# Patient Record
Sex: MALE | Race: WHITE | NOT HISPANIC OR LATINO | Employment: FULL TIME | URBAN - METROPOLITAN AREA
[De-identification: names, ages, dates, MRNs, and addresses within clinical notes are randomized per-mention and may not be internally consistent; named-entity substitution may affect disease eponyms.]

---

## 2017-10-09 ENCOUNTER — TELEPHONE (OUTPATIENT)
Dept: UROLOGY | Facility: CLINIC | Age: 52
End: 2017-10-09

## 2017-10-09 DIAGNOSIS — R30.0 DYSURIA: Primary | ICD-10-CM

## 2017-10-09 NOTE — TELEPHONE ENCOUNTER
"[facility] Call Center    Phone Message    Name of Caller: Suraj    Phone Number: Home number on file [phone] (home) or Cell number on file:    Telephone Information:   Mobile [phone]         Best time to return call: Any    May a detailed message be left on voicemail: yes    Relation to patient: Self    Reason for Call: Symptoms or Concerns     Does patient have any of the following \"red flag\" symptoms: None    Does patient have any \"Red Flag\" symptoms? No.     Current symptom or concern: Patient was last seen with Dr. Aleman on 11/09/16 and since then has had surgery on colon at [facility] and states they were going to look at his bladder at that time. Patient currently experiencing  intermittent burning sensation in bladder \"feels like its on fire\" denies any signs of blood in urine. Patient would like to follow up and called to discuss what is recommended for him- does he need to be scheduled for a cystoscopy or a follow up with Dr. Aleman.     Symptoms have been present for:  1 month(s)      Action Taken: Message routed to:  Adult Clinics: Urology p [id number]    "

## 2017-10-09 NOTE — TELEPHONE ENCOUNTER
Left generic message with request for patient to return call back to clinic. When patient returns call, will discuss message below and encourage patient to leave a urine sample for UA/UC to rule out infection.     Charity Hahn RN, BSN

## 2017-10-10 NOTE — TELEPHONE ENCOUNTER
Patient is calling returning phone call to clinic. Patient states he is getting frustrated that he has not been able to reach the clinic. Please advise.

## 2017-10-10 NOTE — TELEPHONE ENCOUNTER
Nurse spoke to patient, he is having burning in his bladder, advised that he submit a UA/UC.  He will submit tomorrow morning.    Molly Cabral RN, BSN  [facility]  GI/Gen Surg/Hepatology Care Coordinator

## 2017-10-10 NOTE — TELEPHONE ENCOUNTER
[facility] Call Center    Phone Message    Name of Caller: WILY GOMES    Phone Number: Home number on file [phone] (home)    Best time to return call: TODAY    May a detailed message be left on voicemail: no    Relation to patient: Self    Reason for Call: Other: Please call patient back.  He will be at work and will try to answer his phone.       Action Taken: Message routed to:  Adult Clinics: Urology p [phone]

## 2017-10-11 DIAGNOSIS — R30.0 DYSURIA: ICD-10-CM

## 2017-10-11 LAB
ALBUMIN UR-MCNC: NEGATIVE MG/DL
APPEARANCE UR: CLEAR
BILIRUB UR QL STRIP: NEGATIVE
COLOR UR AUTO: YELLOW
GLUCOSE UR STRIP-MCNC: NEGATIVE MG/DL
HGB UR QL STRIP: NEGATIVE
KETONES UR STRIP-MCNC: NEGATIVE MG/DL
LEUKOCYTE ESTERASE UR QL STRIP: NEGATIVE
NITRATE UR QL: NEGATIVE
PH UR STRIP: 6 PH (ref 5–7)
SOURCE: NORMAL
SP GR UR STRIP: 1.02 (ref 1–1.03)
UROBILINOGEN UR STRIP-ACNC: 0.2 EU/DL (ref 0.2–1)

## 2017-10-11 PROCEDURE — 81003 URINALYSIS AUTO W/O SCOPE: CPT | Performed by: UROLOGY

## 2017-10-11 NOTE — TELEPHONE ENCOUNTER
"Patient called after reading result note from Dr. Aleman. Patient aware of normal 10/11/17 UA results. Informed patient that per Dr. Aleman, with patient's history of bladder cancer, patient should see Dr. Perkins or Dr. Demarco at the [facility]. Patient expressed frustration and stated, \"I already saw Dr. Perkins and it didn't go well. I would rather just see someone in [place]. I don't really know what happened or if I even had cancer.\" Patient questions answered. Patient was given number to [facility] and will call to schedule.     Patient scheduled to see Dr. Demarco on 10/14/17 at the [facility]. Will close encounter at this time.    Charity Hahn RN, BSN    "

## 2017-10-12 ENCOUNTER — PRE VISIT (OUTPATIENT)
Dept: UROLOGY | Facility: CLINIC | Age: 52
End: 2017-10-12

## 2017-11-22 ENCOUNTER — OFFICE VISIT (OUTPATIENT)
Dept: UROLOGY | Facility: CLINIC | Age: 52
End: 2017-11-22
Payer: COMMERCIAL

## 2017-11-22 VITALS
SYSTOLIC BLOOD PRESSURE: 159 MMHG | TEMPERATURE: 97.9 F | DIASTOLIC BLOOD PRESSURE: 99 MMHG | HEART RATE: 100 BPM | OXYGEN SATURATION: 98 %

## 2017-11-22 DIAGNOSIS — Z85.51 PERSONAL HISTORY OF MALIGNANT NEOPLASM OF BLADDER: Primary | ICD-10-CM

## 2017-11-22 LAB
ALBUMIN UR-MCNC: 10 MG/DL
APPEARANCE UR: CLEAR
BILIRUB UR QL STRIP: NEGATIVE
COLOR UR AUTO: YELLOW
GLUCOSE UR STRIP-MCNC: NEGATIVE MG/DL
HGB UR QL STRIP: NEGATIVE
KETONES UR STRIP-MCNC: NEGATIVE MG/DL
LEUKOCYTE ESTERASE UR QL STRIP: NEGATIVE
MUCOUS THREADS #/AREA URNS LPF: PRESENT /LPF
NITRATE UR QL: NEGATIVE
PH UR STRIP: 7 PH (ref 5–7)
RBC #/AREA URNS AUTO: ABNORMAL /HPF
SOURCE: ABNORMAL
SP GR UR STRIP: 1.01 (ref 1–1.03)
UROBILINOGEN UR STRIP-MCNC: NORMAL MG/DL (ref 0–2)
WBC #/AREA URNS AUTO: ABNORMAL /HPF

## 2017-11-22 PROCEDURE — 88112 CYTOPATH CELL ENHANCE TECH: CPT | Performed by: UROLOGY

## 2017-11-22 PROCEDURE — 52000 CYSTOURETHROSCOPY: CPT | Performed by: UROLOGY

## 2017-11-22 PROCEDURE — 81001 URINALYSIS AUTO W/SCOPE: CPT | Performed by: UROLOGY

## 2017-11-22 ASSESSMENT — PAIN SCALES - GENERAL: PAINLEVEL: NO PAIN (0)

## 2017-11-22 NOTE — LETTER
11/22/2017      RE: Suraj Green  [address] [facility] [address]       Pre-procedure diagnosis:  History of LG TA TCCB, also CIS of the bladder.  Post-procedure diagnosis: Normal cystoscopy  Procedure performed:  Cystourethroscopy  Surgeon:    Jose AVILEZ   Anesthesia:    Local    Indications for procedure:   Suraj Green is a 50 year old male with a history of low grade Ta TCC resected Jan 2016  Normal upper tracts at that time.  His first surveillance cystoscopy May 2016 was negative..    In Sept 2016 he had a partial colectomy at diverting colostomy ( temporary) at [facility].  He had gross hematuria during that hospital stay.  Also there was some concern that diverticulosis was invading or involving the bladder.  Cystoscopy/biopsy/fulguration done at the outside facility 9/2/16 showed CIS of the posterior bladder wall.  This was just fulgurated,  no BCG was done.    I did a follow-up cystoscopy Nov 9, 2016. Findings at that time were:  At the dome there was a 1.5 cm lump below the mucosa,- appears to be urachal remnant, perhaps larger than last scope 6 months ago.  There was a small resection site that looked to be on the right floor. Slight neovascularity around that scar but no evidence of recurrent tumor.  There were two scars near the posterior wall.  Possible slight cobblestoning of mucosa around the urachal/dome mass.    I referred him to Dr. Demarco for an opinion on the urachal mass but he did not keep that raymond.  He's here for a follow-up cystoscopy today 1 year later, having had no urology follow-up since 11/2016.      Description of procedure:   After fully informed, voluntary consent was obtained, the patient was brought into the procedure room, identified and placed in a supine position on the cysto table.  The groin/scrotum were prepped and draped in a sterile fashion with betadine.  A 15F flexible cystoscope was inserted into the urethra and the bladder and urethra examined  in a systematic manner.  The patient tolerated the procedure well and there were no complications.      Cystoscopic findings:  The urethra showed a fossa stricture that dilated with moderately firm passage of the cystoscope. There was a scant band of tissue at the proximal bulbous urethra.  The prostate was 3cm long and demonstrated bilobar hypertrophy.  There was no median lobe.  The bladder was completely surveyed.  There was no significant  trabeculation.  There were no tumors, stones, or diverticula identifed.  The ureteric orifices were normal in position and number and effluxing clear urine.  The previously seen dome lesion was not present- perhaps this was an intestinal diverticular problem that has healed.  There was a small old TURBT resection site that looked to be on the mid floor. Slight neovascularity around that scar but no evidence of recurrent tumor.  There were two scars near the posterior wall. Old cystoscopy/biopsy/fulguration scars seen on the right and left wall    Assessment/Plan:   Suraj Green is a 52 year old male with a history of Ta and CIS bladder cancer , now with no cancer findings on cystoscopy.      -send urinary cytology today  - return to clinic office cystoscopy 1 year.  - advised smoking cessation.  Jose Aleman MD

## 2017-11-22 NOTE — NURSING NOTE
"Suraj Green's goals for this visit include:   Chief Complaint   Patient presents with     RECHECK     Bladder f/u       He requests these members of his care team be copied on today's visit information: Yes    PCP: Lucía Edmonds        Chief Complaint   Patient presents with     RECHECK     Bladder f/u       Initial BP (!) 159/99 (BP Location: Left arm, Patient Position: Sitting, Cuff Size: Adult Regular)  Pulse 100  Temp 97.9  F (36.6  C) (Oral)  SpO2 98% Estimated body mass index is 33.5 kg/(m^2) as calculated from the following:    Height as of 4/27/16: 1.727 m (5' 8\").    Weight as of 4/27/16: 99.9 kg (220 lb 4.8 oz).  Medication Reconciliation: complete    Do you need any medication refills at today's visit? No    "

## 2017-11-22 NOTE — MR AVS SNAPSHOT
After Visit Summary   11/22/2017    Suraj Green    MRN: [id number]           Patient Information     Date Of Birth          1965        Visit Information        Provider Department      11/22/2017 9:00 AM Heron Aleman MD [facility]        Care Instructions    After Your Cystoscopy    What happens after the exam?    You may go back to your normal diet and activity as you feel ready, unless your doctor tells you not to.    For the next two days, you may notice:    Some blood in your urine  Some burning when you urinate (use the toilet)  An urge to urinate more often  Bladder spasms    These are normal after the procedure and should go away after a day or two.  To relieve these problems drink 6 to 8 large glasses of water each day (includes drinks at meals) as this will help clear the urine.  Take warm baths to relieve pain and bladder spasms.  Do not add anything to the bath water.  You may also take Tylenol (acetaminophen) for pain if needed.    When should I call my doctor?    A fever over 100F (38C) for more than a day. (Before you call the doctor, check your temperature under your tongue)  Chills  Failure to urinate: No urine comes out when you try to use the toilet. (Try soaking in a bathtub full of warm water. If still no urine, call your doctor)  A lot of blood in the urine, or blood clots larger than a nickel  Pain in the back or belly area (abdomen)  Pain or spasms that are not relieved by warm tub baths and pain medicine  Severe pain, burning or other problems while passing urine  Pain that gets worse after two days                      Follow-ups after your visit        Your next 10 appointments already scheduled     Nov 21, 2018  8:30 AM CST   CYSTO with Heron Aleman MD   [facility]    [address] [address] [place] [id number] [phone]              Who to contact     If you have questions or need  follow up information about today's clinic visit or your schedule please contact [facility] directly at [phone].  Normal or non-critical lab and imaging results will be communicated to you by [employer], letter or phone within 4 business days after the clinic has received the results. If you do not hear from us within 7 days, please contact the clinic through [employer] or phone. If you have a critical or abnormal lab result, we will notify you by phone as soon as possible.  Submit refill requests through [employer] or call your pharmacy and they will forward the refill request to us. Please allow 3 business days for your refill to be completed.          Additional Information About Your Visit        [employer] Information     [employer] gives you secure access to your electronic health record. If you see a primary care provider, you can also send messages to your care team and make appointments. If you have questions, please call your primary care clinic.  If you do not have a primary care provider, please call [phone] and they will assist you.      [employer] is an electronic gateway that provides easy, online access to your medical records. With [employer], you can request a clinic appointment, read your test results, renew a prescription or communicate with your care team.     To access your existing account, please contact your [facility] or call [phone] for assistance.        Care EveryWhere ID     This is your Care EveryWhere ID. This could be used by other organizations to access your [facility] medical records  [id number]        Your Vitals Were     Pulse Temperature Pulse Oximetry             100 97.9  F (36.6  C) (Oral) 98%          Blood Pressure from Last 3 Encounters:   11/22/17 (!) 159/99   11/09/16 (!) 168/102   05/04/16 (!) 163/99    Weight from Last 3 Encounters:   04/27/16 99.9 kg (220 lb 4.8 oz)   06/07/11 108.3 kg (238 lb 12.8 oz)   05/17/11 111.5 kg  (245 lb 13 oz)              Today, you had the following     No orders found for display       Primary Care Provider Office Phone # Fax #    [facility] [phone]       [place] [id number] [facility] [address]        Equal Access to Services     DANUTA HAHN : Karyn smith hadasho Soomaali, waaxda luqadaha, qaybta kaalmada adeegyada, waxsharan leefayeligio mckeon. So [facility] [phone].    ATENCIÓN: Si habla español, tiene a victor disposición servicios gratuitos de asistencia lingüística. Llame al [phone].    We comply with applicable federal civil rights laws and [place] laws. We do not discriminate on the basis of race, color, national origin, age, disability, sex, sexual orientation, or gender identity.            Thank you!     Thank you for choosing [facility]  for your care. Our goal is always to provide you with excellent care. Hearing back from our patients is one way we can continue to improve our services. Please take a few minutes to complete the written survey that you may receive in the mail after your visit with us. Thank you!             Your Updated Medication List - Protect others around you: Learn how to safely use, store and throw away your medicines at www.disposemymeds.org.          This list is accurate as of: 11/22/17  9:29 AM.  Always use your most recent med list.                   Brand Name Dispense Instructions for use Diagnosis    aspirin 81 MG EC tablet           fish oil-omega-3 fatty acids 1000 MG capsule           MULTIVITAMINS PO           PROBIOTIC & ACIDOPHILUS EX ST PO

## 2017-11-22 NOTE — PROGRESS NOTES
Pre-procedure diagnosis:  History of LG TA TCCB, also CIS of the bladder.  Post-procedure diagnosis: Normal cystoscopy  Procedure performed:  Cystourethroscopy  Surgeon:    Jose AVILEZ   Anesthesia:    Local    Indications for procedure:   Suraj Green is a 50 year old male with a history of low grade Ta TCC resected Jan 2016  Normal upper tracts at that time.  His first surveillance cystoscopy May 2016 was negative..    In Sept 2016 he had a partial colectomy at diverting colostomy ( temporary) at [facility].  He had gross hematuria during that hospital stay.  Also there was some concern that diverticulosis was invading or involving the bladder.  Cystoscopy/biopsy/fulguration done at the outside facility 9/2/16 showed CIS of the posterior bladder wall.  This was just fulgurated,  no BCG was done.    I did a follow-up cystoscopy Nov 9, 2016. Findings at that time were:  At the dome there was a 1.5 cm lump below the mucosa,- appears to be urachal remnant, perhaps larger than last scope 6 months ago.  There was a small resection site that looked to be on the right floor. Slight neovascularity around that scar but no evidence of recurrent tumor.  There were two scars near the posterior wall.  Possible slight cobblestoning of mucosa around the urachal/dome mass.    I referred him to Dr. Demarco for an opinion on the urachal mass but he did not keep that raymond.  He's here for a follow-up cystoscopy today 1 year later, having had no urology follow-up since 11/2016.      Description of procedure:   After fully informed, voluntary consent was obtained, the patient was brought into the procedure room, identified and placed in a supine position on the cysto table.  The groin/scrotum were prepped and draped in a sterile fashion with betadine.  A 15F flexible cystoscope was inserted into the urethra and the bladder and urethra examined in a systematic manner.  The patient tolerated the procedure well and there were no  complications.      Cystoscopic findings:  The urethra showed a fossa stricture that dilated with moderately firm passage of the cystoscope. There was a scant band of tissue at the proximal bulbous urethra.  The prostate was 3cm long and demonstrated bilobar hypertrophy.  There was no median lobe.  The bladder was completely surveyed.  There was no significant  trabeculation.  There were no tumors, stones, or diverticula identifed.  The ureteric orifices were normal in position and number and effluxing clear urine.  The previously seen dome lesion was not present- perhaps this was an intestinal diverticular problem that has healed.  There was a small old TURBT resection site that looked to be on the mid floor. Slight neovascularity around that scar but no evidence of recurrent tumor.  There were two scars near the posterior wall. Old cystoscopy/biopsy/fulguration scars seen on the right and left wall    Assessment/Plan:   Suraj Green is a 52 year old male with a history of Ta and CIS bladder cancer , now with no cancer findings on cystoscopy.      -send urinary cytology today  - return to clinic office cystoscopy 1 year.  - advised smoking cessation.  Jose AVILEZ

## 2017-11-24 LAB — COPATH REPORT: NORMAL

## 2018-12-03 ENCOUNTER — TELEPHONE (OUTPATIENT)
Dept: UROLOGY | Facility: CLINIC | Age: 53
End: 2018-12-03

## 2018-12-03 NOTE — TELEPHONE ENCOUNTER
[facility] Call Center    Phone Message    May a detailed message be left on voicemail: yes    Reason for Call: Other: Pt and wife made a MyChart message and are wondering if they scheduled correctly for what he needs to get done since he missed an appt in November.  Please check and ok to leave message.  Pt wants to make sure everything that they need to get done is on that day and before then end of the year.       Action Taken: Message routed to:  Adult Clinics: Urology p [phone]

## 2018-12-03 NOTE — TELEPHONE ENCOUNTER
Chart reviewed and per 11/22/17 office note from Dr. Aleman, patient to return to clinic in 1 year for office cystoscopy. Unable to accommodate cystoscopy with Dr. Aleman before the end of the year. Cysto spot with Dr. Lopez on hold for 12/18/18 in [place].    Attempted to reach patient by phone, but no answer. Left message for patient stating that we received the message and requested for patient to return call to clinic. When patient returns call, will inform him of the above information.    Charity Hahn RN, BSN

## 2019-10-03 ENCOUNTER — HEALTH MAINTENANCE LETTER (OUTPATIENT)
Age: 54
End: 2019-10-03

## 2020-11-07 ENCOUNTER — HEALTH MAINTENANCE LETTER (OUTPATIENT)
Age: 55
End: 2020-11-07

## 2021-09-05 ENCOUNTER — HEALTH MAINTENANCE LETTER (OUTPATIENT)
Age: 56
End: 2021-09-05

## 2021-12-10 ENCOUNTER — OFFICE VISIT (OUTPATIENT)
Dept: UROLOGY | Facility: CLINIC | Age: 56
End: 2021-12-10
Payer: COMMERCIAL

## 2021-12-10 VITALS — HEART RATE: 101 BPM | OXYGEN SATURATION: 96 % | SYSTOLIC BLOOD PRESSURE: 147 MMHG | DIASTOLIC BLOOD PRESSURE: 84 MMHG

## 2021-12-10 DIAGNOSIS — R03.0 ELEVATED BP WITHOUT DIAGNOSIS OF HYPERTENSION: ICD-10-CM

## 2021-12-10 DIAGNOSIS — N40.1 BENIGN PROSTATIC HYPERPLASIA WITH LOWER URINARY TRACT SYMPTOMS, SYMPTOM DETAILS UNSPECIFIED: Primary | ICD-10-CM

## 2021-12-10 DIAGNOSIS — Z85.51 PERSONAL HISTORY OF MALIGNANT NEOPLASM OF BLADDER: ICD-10-CM

## 2021-12-10 LAB
ALBUMIN UR-MCNC: NEGATIVE MG/DL
APPEARANCE UR: CLEAR
BACTERIA #/AREA URNS HPF: ABNORMAL /HPF
BILIRUB UR QL STRIP: NEGATIVE
COLOR UR AUTO: YELLOW
GLUCOSE UR STRIP-MCNC: NEGATIVE MG/DL
HGB UR QL STRIP: ABNORMAL
KETONES UR STRIP-MCNC: NEGATIVE MG/DL
LEUKOCYTE ESTERASE UR QL STRIP: NEGATIVE
NITRATE UR QL: NEGATIVE
PH UR STRIP: 7 [PH] (ref 5–7)
RBC #/AREA URNS AUTO: ABNORMAL /HPF
SP GR UR STRIP: 1.02 (ref 1–1.03)
UROBILINOGEN UR STRIP-ACNC: 0.2 E.U./DL
WBC #/AREA URNS AUTO: ABNORMAL /HPF

## 2021-12-10 PROCEDURE — 81001 URINALYSIS AUTO W/SCOPE: CPT | Performed by: UROLOGY

## 2021-12-10 PROCEDURE — 99204 OFFICE O/P NEW MOD 45 MIN: CPT | Mod: 25 | Performed by: UROLOGY

## 2021-12-10 PROCEDURE — 88112 CYTOPATH CELL ENHANCE TECH: CPT | Performed by: PATHOLOGY

## 2021-12-10 PROCEDURE — 51798 US URINE CAPACITY MEASURE: CPT | Performed by: UROLOGY

## 2021-12-10 RX ORDER — TAMSULOSIN HYDROCHLORIDE 0.4 MG/1
0.4 CAPSULE ORAL AT BEDTIME
Qty: 30 CAPSULE | Refills: 1 | Status: SHIPPED | OUTPATIENT
Start: 2021-12-10 | End: 2022-12-30

## 2021-12-10 NOTE — PATIENT INSTRUCTIONS
[employer] is for any [facility] looking for free support to quit smoking, vaping or chewing.   [employer] will offer many quit support options and resources so [place] residents can continue to find the way to quit that works best for them.   Free support includes personalized coaching, email and text support, educational materials, and quit medication (nicotine patches, gum or lozenges) delivered by mail.     Contact [employer] at 1-800-QUIT-NOW or online at [employer] to receive support on your quit journey.

## 2021-12-10 NOTE — PROGRESS NOTES
S: Suraj Green is a pleasant  56 year old male who was seen for a consult with regard to patient's urinary complaints.  Patient complains of Sensation of incomplete emptying, Strain to Urinate, Nocturia x 1, Urgency, Frequency, Intermittency and slow urinary stream.  He has no history of elevated PSA.  Symptoms have been on going for a couple of years(s).  Seems to be worsened over time.  His recent PSA was found to be normal.    His AUA Symptom Score:  20.  His QOL score:  4.  He has no dysuria or hematuria.  He has h/o low grade bladder cancer with last cystoscopy done in 2017.  He continues to smoke.    Current Outpatient Medications   Medication Sig Dispense Refill     aspirin 81 MG EC tablet        fish oil-omega-3 fatty acids 1000 MG capsule        Multiple Vitamin (MULTIVITAMINS PO)        tamsulosin (FLOMAX) 0.4 MG capsule Take 1 capsule (0.4 mg) by mouth At Bedtime 30 capsule 1     Allergies   Allergen Reactions     Ciprofloxacin Hives     Hives developed after IV Cipro     Past Medical History:   Diagnosis Date     Bladder cancer (H)      Diverticulitis      Palpitations      Past Surgical History:   Procedure Laterality Date     BLADDER SURGERY  1/2016    bladder tuor resection.     LAPAROSCOPIC APPENDECTOMY        Family History   Problem Relation Age of Onset     Heart Disease Father      Cancer - colorectal Maternal Grandfather      He does not have a family history of prostate cancer.  Social History     Socioeconomic History     Marital status:      Spouse name: None     Number of children: None     Years of education: None     Highest education level: None   Occupational History     None   Tobacco Use     Smoking status: Current Every Day Smoker     Packs/day: 1.00     Types: Cigarettes     Smokeless tobacco: Never Used   Substance and Sexual Activity     Alcohol use: Yes     Alcohol/week: 12.0 standard drinks     Types: 12 Standard drinks or equivalent per week     Comment: occasional      Drug use: No     Sexual activity: None   Other Topics Concern     Parent/sibling w/ CABG, MI or angioplasty before 65F 55M? Not Asked   Social History Narrative     None     Social Determinants of Health     Financial Resource Strain: Not on file   Food Insecurity: Not on file   Transportation Needs: Not on file   Physical Activity: Not on file   Stress: Not on file   Social Connections: Not on file   Intimate Partner Violence: Not on file   Housing Stability: Not on file        REVIEW OF SYSTEMS  =================  C: NEGATIVE for fever, chills, change in weight  I: NEGATIVE for worrisome rashes, moles or lesions  E/M: NEGATIVE for ear, mouth and throat problems  R: NEGATIVE for significant cough or SHORTNESS OF BREATH  CV:  NEGATIVE for chest pain, palpitations or peripheral edema  GI: NEGATIVE for nausea, abdominal pain, heartburn, or change in bowel habits  NEURO: NEGATIVE numbness/weakness  : see HPI  PSYCH: NEGATIVE depression/anxiety  LYmph: no new enlarged lymph nodes  Ortho: no new trauma/movements           O: Exam:BP (!) 147/84 (BP Location: Right arm, Patient Position: Chair, Cuff Size: Adult Large)   Pulse 101   SpO2 96%    Constitutional: healthy, alert and no distress  Cardiovascular: negative, PMI normal.   Respiratory: negative, no evidence of respiratory distress  Gastrointestinal: Abdomen soft, non-tender. BS normal. No masses, organomegaly  : penis no discharge. Testis no masses.  No scrotal skin lesion.  Prostate 30 gm smooth.  Musculoskeletal: extremities normal- no gross deformities noted, gait normal and normal muscle tone  Skin: no suspicious lesions or rashes  Neurologic: Alert and oriented  Psychiatric: mentation appears normal. and affect normal/bright  Hematologic/Lymphatic/Immunologic: normal ant/post cervical, axillary, supraclavicular and inguinal nodes    Assessment/Plan:   (N40.1) Benign prostatic hyperplasia with lower urinary tract symptoms, symptom details unspecified   (primary encounter diagnosis)  Comment: PVR < 100 ml  Plan: UA reflex to Microscopic and Culture [[id number]],        Urine Microscopic, MEASURE POST-VOID RESIDUAL         URINE/BLADDER CAPACITY, US NON-IMAGING (26195),        tamsulosin (FLOMAX) 0.4 MG capsule         Side effects discussed    (Z85.51) Personal history of malignant neoplasm of bladder  Comment:    Plan: Cytology non gyn [[id number]]           Schedule for cysto next.    Elevated bp: Suraj to follow up with Primary Care provider regarding elevated blood pressure.

## 2021-12-10 NOTE — PROGRESS NOTES
Bladder Scan performed. 21ml maximum residual urine detected after 3 scans. MD informed.    Sandhya ROSE RN Urology 12/10/2021 9:26 AM

## 2021-12-14 LAB
PATH REPORT.COMMENTS IMP SPEC: NORMAL
PATH REPORT.FINAL DX SPEC: NORMAL
PATH REPORT.GROSS SPEC: NORMAL
PATH REPORT.RELEVANT HX SPEC: NORMAL

## 2021-12-26 ENCOUNTER — HEALTH MAINTENANCE LETTER (OUTPATIENT)
Age: 56
End: 2021-12-26

## 2022-01-24 ENCOUNTER — OFFICE VISIT (OUTPATIENT)
Dept: UROLOGY | Facility: CLINIC | Age: 57
End: 2022-01-24
Payer: COMMERCIAL

## 2022-01-24 DIAGNOSIS — Z85.51 PERSONAL HISTORY OF MALIGNANT NEOPLASM OF BLADDER: Primary | ICD-10-CM

## 2022-01-24 DIAGNOSIS — N40.1 BENIGN PROSTATIC HYPERPLASIA WITH LOWER URINARY TRACT SYMPTOMS, SYMPTOM DETAILS UNSPECIFIED: ICD-10-CM

## 2022-01-24 PROCEDURE — 52000 CYSTOURETHROSCOPY: CPT | Performed by: UROLOGY

## 2022-01-24 PROCEDURE — 99212 OFFICE O/P EST SF 10 MIN: CPT | Mod: 25 | Performed by: UROLOGY

## 2022-01-24 NOTE — PROGRESS NOTES
S: Suraj Green is a 56 year old male returns for bladder cancer surveillance.    he has history of bladder cancer      He received 0 courses of BCG therapy.    Patient is draped and prepped.  Flexible cystoscopy placed under direct vision.    Cysto:  The anterior urethra is normal   The prostatic urethra is normal.     The length is 1cm,  the coaptation is 1 cm.     In the bladder there is normal mucosa.    Assessment/Plan:  (Z85.51) Personal history of malignant neoplasm of bladder  (primary encounter diagnosis)  Comment: no evidence of recurrence  Plan: CYSTOURETHROSCOPY (92379)        In one year    (N40.1) Benign prostatic hyperplasia with lower urinary tract symptoms, symptom details unspecified  Comment: patient has not tried flomax yet  Plan: CYSTOURETHROSCOPY (77089)          Try flomax

## 2022-01-24 NOTE — PATIENT INSTRUCTIONS
Patient Education   Resources to Help You Quit Smoking  If you have quit smoking or are thinking about quitting, congratulations! It can be hard to quit smoking, but the benefits are well worth it. To help you quit and stay smoke-free, there are many resources that can help.  Your health plan  If you have health insurance, call them for more details about their phone coaching programs.    [facility]:  3-107-657-BLUE ([phone])    [facility]:  3-387-843-QUIT (1-722.356.8950)    [facility]:  7-514-625-BLUE ([phone])    [facility]:  [phone]    [facility]:  [phone]    [facility] members:  [phone]    [facility]:   [phone]    [facility]:  [phone]    [facility]:  [phone]  Other resources  American Cancer Society: 1-642.854.9337  The American Cancer Society can help you find local resources to quit smoking.  QUIT[employer]AN: 5-537-433-PLAN (1-871.259.1315)  Offers a telephone helpline, gum, patches and lozenges. These services are free for the uninsured and those without coverage. The online program is free to everyone at www.quitplan.com.  American Lung Association: -491-LUNG-USA (1-190.561.2860)  Provides a lung helpline as well as an online program, self-help book and group clinic support for quitting smoking. www.lung.org/stop-smoking  National Cancer [place]:  4-819-309-QUIT (1-583.331.5866)  Offers a telephone hotline, online text chat and a website with tools, information and support for smokers who want to quit. www.smokefree.gov  Medication Therapy Management (MTM):  [phone] (toll free)  mtm@[place].org  This is a clinic program to help you quit smoking. It offers one-on-one sessions with a pharmacist. Call or email to find out if your insurance covers MTM and to schedule an appointment at all locations.  For informational purposes only.  Not to replace the advice of your health care provider. Copyright   2013 [employer]. All rights reserved. Clinically reviewed by Barby Siegel MD, [facility] Lung Cancer Screening Program. [employer] 675769 - Rev 06/19.

## 2022-09-05 ENCOUNTER — HOSPITAL ENCOUNTER (EMERGENCY)
Facility: CLINIC | Age: 57
Discharge: HOME | End: 2022-09-06
Attending: FAMILY MEDICINE | Admitting: FAMILY MEDICINE
Payer: COMMERCIAL

## 2022-09-05 DIAGNOSIS — I49.8 VENTRICULAR BIGEMINY: ICD-10-CM

## 2022-09-05 DIAGNOSIS — R06.00 DYSPNEA, UNSPECIFIED TYPE: ICD-10-CM

## 2022-09-05 DIAGNOSIS — R10.32 ABDOMINAL PAIN, LEFT LOWER QUADRANT: ICD-10-CM

## 2022-09-05 DIAGNOSIS — F41.9 ANXIETY: ICD-10-CM

## 2022-09-05 DIAGNOSIS — R91.8 PULMONARY NODULES: ICD-10-CM

## 2022-09-05 LAB
ALBUMIN SERPL-MCNC: 3.4 G/DL (ref 3.4–5)
ALP SERPL-CCNC: 70 U/L (ref 40–150)
ALT SERPL W P-5'-P-CCNC: 27 U/L (ref 0–70)
ANION GAP SERPL CALCULATED.3IONS-SCNC: 6 MMOL/L (ref 3–14)
AST SERPL W P-5'-P-CCNC: 19 U/L (ref 0–45)
BASOPHILS # BLD AUTO: 0 10E3/UL (ref 0–0.2)
BASOPHILS NFR BLD AUTO: 0 %
BILIRUB SERPL-MCNC: 0.3 MG/DL (ref 0.2–1.3)
BUN SERPL-MCNC: 19 MG/DL (ref 7–30)
CALCIUM SERPL-MCNC: 8.6 MG/DL (ref 8.5–10.1)
CHLORIDE BLD-SCNC: 107 MMOL/L (ref 94–109)
CO2 SERPL-SCNC: 26 MMOL/L (ref 20–32)
CREAT SERPL-MCNC: 0.71 MG/DL (ref 0.66–1.25)
D DIMER PPP FEU-MCNC: 1.07 UG/ML FEU (ref 0–0.5)
EOSINOPHIL # BLD AUTO: 0.2 10E3/UL (ref 0–0.7)
EOSINOPHIL NFR BLD AUTO: 2 %
ERYTHROCYTE [DISTWIDTH] IN BLOOD BY AUTOMATED COUNT: 14.7 % (ref 10–15)
FLUAV RNA SPEC QL NAA+PROBE: NEGATIVE
FLUBV RNA RESP QL NAA+PROBE: NEGATIVE
GFR SERPL CREATININE-BSD FRML MDRD: >90 ML/MIN/1.73M2
GLUCOSE BLD-MCNC: 108 MG/DL (ref 70–99)
HCT VFR BLD AUTO: 48 % (ref 40–53)
HGB BLD-MCNC: 16.2 G/DL (ref 13.3–17.7)
HOLD SPECIMEN: NORMAL
IMM GRANULOCYTES # BLD: 0 10E3/UL
IMM GRANULOCYTES NFR BLD: 0 %
LYMPHOCYTES # BLD AUTO: 3.1 10E3/UL (ref 0.8–5.3)
LYMPHOCYTES NFR BLD AUTO: 30 %
MAGNESIUM SERPL-MCNC: 2.2 MG/DL (ref 1.6–2.3)
MCH RBC QN AUTO: 31 PG (ref 26.5–33)
MCHC RBC AUTO-ENTMCNC: 33.8 G/DL (ref 31.5–36.5)
MCV RBC AUTO: 92 FL (ref 78–100)
MONOCYTES # BLD AUTO: 0.8 10E3/UL (ref 0–1.3)
MONOCYTES NFR BLD AUTO: 8 %
NEUTROPHILS # BLD AUTO: 6.2 10E3/UL (ref 1.6–8.3)
NEUTROPHILS NFR BLD AUTO: 60 %
NRBC # BLD AUTO: 0 10E3/UL
NRBC BLD AUTO-RTO: 0 /100
PLATELET # BLD AUTO: 315 10E3/UL (ref 150–450)
POTASSIUM BLD-SCNC: 4 MMOL/L (ref 3.4–5.3)
PROT SERPL-MCNC: 7.2 G/DL (ref 6.8–8.8)
RBC # BLD AUTO: 5.23 10E6/UL (ref 4.4–5.9)
SARS-COV-2 RNA RESP QL NAA+PROBE: NEGATIVE
SODIUM SERPL-SCNC: 139 MMOL/L (ref 133–144)
T4 FREE SERPL-MCNC: 1.01 NG/DL (ref 0.76–1.46)
TROPONIN I SERPL HS-MCNC: 8 NG/L
TSH SERPL DL<=0.005 MIU/L-ACNC: 8.52 MU/L (ref 0.4–4)
WBC # BLD AUTO: 10.4 10E3/UL (ref 4–11)

## 2022-09-05 PROCEDURE — 80053 COMPREHEN METABOLIC PANEL: CPT | Performed by: FAMILY MEDICINE

## 2022-09-05 PROCEDURE — 84443 ASSAY THYROID STIM HORMONE: CPT | Performed by: FAMILY MEDICINE

## 2022-09-05 PROCEDURE — 99285 EMERGENCY DEPT VISIT HI MDM: CPT | Mod: 25 | Performed by: FAMILY MEDICINE

## 2022-09-05 PROCEDURE — 85379 FIBRIN DEGRADATION QUANT: CPT | Performed by: FAMILY MEDICINE

## 2022-09-05 PROCEDURE — 84439 ASSAY OF FREE THYROXINE: CPT | Performed by: FAMILY MEDICINE

## 2022-09-05 PROCEDURE — 93005 ELECTROCARDIOGRAM TRACING: CPT | Performed by: FAMILY MEDICINE

## 2022-09-05 PROCEDURE — 93010 ELECTROCARDIOGRAM REPORT: CPT | Performed by: FAMILY MEDICINE

## 2022-09-05 PROCEDURE — 36415 COLL VENOUS BLD VENIPUNCTURE: CPT | Performed by: FAMILY MEDICINE

## 2022-09-05 PROCEDURE — 85025 COMPLETE CBC W/AUTO DIFF WBC: CPT | Performed by: FAMILY MEDICINE

## 2022-09-05 PROCEDURE — 96361 HYDRATE IV INFUSION ADD-ON: CPT | Performed by: FAMILY MEDICINE

## 2022-09-05 PROCEDURE — 96374 THER/PROPH/DIAG INJ IV PUSH: CPT | Mod: 59 | Performed by: FAMILY MEDICINE

## 2022-09-05 PROCEDURE — 250N000011 HC RX IP 250 OP 636: Performed by: FAMILY MEDICINE

## 2022-09-05 PROCEDURE — 258N000003 HC RX IP 258 OP 636: Performed by: FAMILY MEDICINE

## 2022-09-05 PROCEDURE — 96375 TX/PRO/DX INJ NEW DRUG ADDON: CPT | Performed by: FAMILY MEDICINE

## 2022-09-05 PROCEDURE — 84484 ASSAY OF TROPONIN QUANT: CPT | Performed by: FAMILY MEDICINE

## 2022-09-05 PROCEDURE — C9803 HOPD COVID-19 SPEC COLLECT: HCPCS | Performed by: FAMILY MEDICINE

## 2022-09-05 PROCEDURE — 87636 SARSCOV2 & INF A&B AMP PRB: CPT | Performed by: FAMILY MEDICINE

## 2022-09-05 PROCEDURE — 83735 ASSAY OF MAGNESIUM: CPT | Performed by: FAMILY MEDICINE

## 2022-09-05 RX ORDER — LISINOPRIL 10 MG/1
10 TABLET ORAL
COMMUNITY
Start: 2021-10-11

## 2022-09-05 RX ORDER — TRAZODONE HYDROCHLORIDE 100 MG/1
TABLET ORAL
COMMUNITY
Start: 2022-05-18

## 2022-09-05 RX ORDER — UREA 10 %
LOTION (ML) TOPICAL
COMMUNITY

## 2022-09-05 RX ORDER — SODIUM CHLORIDE 9 MG/ML
INJECTION, SOLUTION INTRAVENOUS CONTINUOUS
Status: DISCONTINUED | OUTPATIENT
Start: 2022-09-05 | End: 2022-09-06 | Stop reason: HOSPADM

## 2022-09-05 RX ORDER — ONDANSETRON 2 MG/ML
4 INJECTION INTRAMUSCULAR; INTRAVENOUS EVERY 30 MIN PRN
Status: DISCONTINUED | OUTPATIENT
Start: 2022-09-05 | End: 2022-09-06 | Stop reason: HOSPADM

## 2022-09-05 RX ORDER — IOPAMIDOL 755 MG/ML
500 INJECTION, SOLUTION INTRAVASCULAR ONCE
Status: COMPLETED | OUTPATIENT
Start: 2022-09-05 | End: 2022-09-06

## 2022-09-05 RX ORDER — HYDROCHLOROTHIAZIDE 12.5 MG/1
12.5 CAPSULE ORAL
COMMUNITY
Start: 2021-10-11

## 2022-09-05 RX ORDER — DIAZEPAM 10 MG/2ML
5 INJECTION, SOLUTION INTRAMUSCULAR; INTRAVENOUS ONCE
Status: COMPLETED | OUTPATIENT
Start: 2022-09-05 | End: 2022-09-05

## 2022-09-05 RX ORDER — LANOLIN ALCOHOL/MO/W.PET/CERES
3 CREAM (GRAM) TOPICAL
COMMUNITY

## 2022-09-05 RX ADMIN — ONDANSETRON 4 MG: 2 INJECTION INTRAMUSCULAR; INTRAVENOUS at 22:53

## 2022-09-05 RX ADMIN — DIAZEPAM 5 MG: 5 INJECTION, SOLUTION INTRAMUSCULAR; INTRAVENOUS at 23:53

## 2022-09-05 RX ADMIN — SODIUM CHLORIDE 1000 ML: 9 INJECTION, SOLUTION INTRAVENOUS at 22:53

## 2022-09-05 ASSESSMENT — ACTIVITIES OF DAILY LIVING (ADL): ADLS_ACUITY_SCORE: 35

## 2022-09-05 NOTE — Clinical Note
Suraj Green was seen and treated in our emergency department on 9/5/2022.  He may return to work on 09/07/2022.       If you have any questions or concerns, please don't hesitate to call.      Adrian Barakat MD

## 2022-09-06 ENCOUNTER — APPOINTMENT (OUTPATIENT)
Dept: CT IMAGING | Facility: CLINIC | Age: 57
End: 2022-09-06
Attending: FAMILY MEDICINE
Payer: COMMERCIAL

## 2022-09-06 VITALS
HEIGHT: 68 IN | BODY MASS INDEX: 32.58 KG/M2 | HEART RATE: 45 BPM | SYSTOLIC BLOOD PRESSURE: 136 MMHG | RESPIRATION RATE: 20 BRPM | OXYGEN SATURATION: 98 % | TEMPERATURE: 97.1 F | WEIGHT: 215 LBS

## 2022-09-06 LAB
ALBUMIN UR-MCNC: NEGATIVE MG/DL
APPEARANCE UR: CLEAR
BACTERIA #/AREA URNS HPF: ABNORMAL /HPF
BILIRUB UR QL STRIP: NEGATIVE
COLOR UR AUTO: YELLOW
GLUCOSE UR STRIP-MCNC: NEGATIVE MG/DL
HGB UR QL STRIP: NEGATIVE
KETONES UR STRIP-MCNC: NEGATIVE MG/DL
LEUKOCYTE ESTERASE UR QL STRIP: NEGATIVE
NITRATE UR QL: NEGATIVE
PH UR STRIP: 7 [PH] (ref 5–7)
RBC URINE: 0 /HPF
SP GR UR STRIP: 1.01 (ref 1–1.03)
SQUAMOUS EPITHELIAL: 2 /HPF
UROBILINOGEN UR STRIP-MCNC: NORMAL MG/DL
WBC URINE: 2 /HPF

## 2022-09-06 PROCEDURE — 81001 URINALYSIS AUTO W/SCOPE: CPT | Performed by: FAMILY MEDICINE

## 2022-09-06 PROCEDURE — 250N000009 HC RX 250: Performed by: FAMILY MEDICINE

## 2022-09-06 PROCEDURE — 74177 CT ABD & PELVIS W/CONTRAST: CPT

## 2022-09-06 PROCEDURE — 250N000011 HC RX IP 250 OP 636: Performed by: FAMILY MEDICINE

## 2022-09-06 RX ADMIN — SODIUM CHLORIDE 70 ML: 9 INJECTION, SOLUTION INTRAVENOUS at 00:17

## 2022-09-06 RX ADMIN — IOPAMIDOL 80 ML: 755 INJECTION, SOLUTION INTRAVENOUS at 00:17

## 2022-09-06 ASSESSMENT — ACTIVITIES OF DAILY LIVING (ADL): ADLS_ACUITY_SCORE: 35

## 2022-09-06 NOTE — ED TRIAGE NOTES
Pt states that over the last few days having abd pain/discomfort lower abd. Today feeling like pain is causing panic attacks, feels like he can't catch his breath - feeling sob. Pt states constipation feeling but did have a BM this morning. Nauseated, denies vomiting.      Triage Assessment     Row Name 09/05/22 9411       Triage Assessment (Adult)    Airway WDL WDL       Respiratory WDL    Respiratory WDL rhythm/pattern    Rhythm/Pattern, Respiratory shallow;shortness of breath       Cardiac WDL    Cardiac WDL WDL

## 2022-09-06 NOTE — DISCHARGE INSTRUCTIONS
Go home and rest.  Drink plenty of fluids.  Diet and activity as tolerated. Take off work today.  Contact your primary physician or cardiologist to consider a Holter monitor and an Echocardiogram (Ultrasound of the heart)  You had some nodules noted in your lungs.  Radiology is recommending a repeat CT scan in 3-6 months to see if they change in size.  Trying to stop smoking.  The rest of your heart tests were reassuring.  Return to the ED if you worsen or have any concerns.  It was a pleasure visiting with both of you.  I am glad you are feeling better and hope you continue to do well.    Thank you for choosing [facility]. We appreciate the opportunity to meet your urgent medical needs. Please let us know if we could have done anything to make your stay more satisfying.    After discharge, please closely monitor for any new or worsening symptoms. Return to the Emergency Department if you develop any acute worsening signs or symptoms.    If you had lab work, cultures or imaging studies done during your stay, the final results may still be pending. We will call you if your plan of care needs to change. However, if you are not improving as expected, please follow up with your primary care provider or clinic.     Start any prescription medications that were prescribed to you and take them as directed.     Please see additional handouts that may be pertinent to your condition.
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
## 2022-10-23 ENCOUNTER — HEALTH MAINTENANCE LETTER (OUTPATIENT)
Age: 57
End: 2022-10-23

## 2022-12-10 ENCOUNTER — HEALTH MAINTENANCE LETTER (OUTPATIENT)
Age: 57
End: 2022-12-10

## 2022-12-30 DIAGNOSIS — N40.1 BENIGN PROSTATIC HYPERPLASIA WITH LOWER URINARY TRACT SYMPTOMS, SYMPTOM DETAILS UNSPECIFIED: ICD-10-CM

## 2022-12-30 RX ORDER — TAMSULOSIN HYDROCHLORIDE 0.4 MG/1
0.4 CAPSULE ORAL AT BEDTIME
Qty: 90 CAPSULE | Refills: 0 | Status: SHIPPED | OUTPATIENT
Start: 2022-12-30

## 2022-12-30 NOTE — CONFIDENTIAL NOTE
ONE TIME EARLENE Refill Flomax. 90 with 0 refills. Last OV= 01/2022. Pt has an appt on 1/25/23.    Sandhya ROSE RN Urology 12/30/2022 9:38 AM

## 2024-01-14 ENCOUNTER — HEALTH MAINTENANCE LETTER (OUTPATIENT)
Age: 59
End: 2024-01-14

## 2025-01-26 ENCOUNTER — HEALTH MAINTENANCE LETTER (OUTPATIENT)
Age: 60
End: 2025-01-26